# Patient Record
Sex: FEMALE | Race: WHITE | NOT HISPANIC OR LATINO | URBAN - METROPOLITAN AREA
[De-identification: names, ages, dates, MRNs, and addresses within clinical notes are randomized per-mention and may not be internally consistent; named-entity substitution may affect disease eponyms.]

---

## 2018-08-20 ENCOUNTER — APPOINTMENT (RX ONLY)
Dept: URBAN - METROPOLITAN AREA CLINIC 87 | Facility: CLINIC | Age: 63
Setting detail: DERMATOLOGY
End: 2018-08-20

## 2018-08-20 PROBLEM — D48.5 NEOPLASM OF UNCERTAIN BEHAVIOR OF SKIN: Status: ACTIVE | Noted: 2018-08-20

## 2018-08-20 PROCEDURE — ? BIOPSY BY SHAVE METHOD

## 2018-08-20 PROCEDURE — 11100: CPT

## 2018-08-20 NOTE — PROCEDURE: BIOPSY BY SHAVE METHOD
Type Of Destruction Used: Curettage
Destruction After The Procedure: No
Additional Anesthesia Volume In Cc (Will Not Render If 0): 0
Curettage Text: The wound bed was treated with curettage after the biopsy was performed.
Electrodesiccation And Curettage Text: The wound bed was treated with electrodesiccation and curettage after the biopsy was performed.
Post-Care Instructions: I reviewed with the patient in detail post-care instructions. Patient is to keep the biopsy site dry overnight, and then clean with gentle soap and water and apply Vaseline or aquaphor twice daily until healed. Patient may apply hydrogen peroxide soaks to remove any crusting.
Consent: Written consent was obtained and risks were reviewed including but not limited to scarring, infection, bleeding, scabbing, incomplete removal, nerve damage and allergy to anesthesia.
Electrodesiccation Text: The wound bed was treated with electrodesiccation after the biopsy was performed.
Billing Type: Third-Party Bill
Dressing: Band-Aid
Lab Facility: 1
Biopsy Type: H and E
Detail Level: Detailed
Biopsy Method: double edge Personna blade
Anesthesia Type: 1% lidocaine with epinephrine
Size Of Lesion In Cm: 0.5
Anesthesia Volume In Cc (Will Not Render If 0): 0.3
Depth Of Biopsy: dermis
Notification Instructions: Patient will be notified of biopsy results. However, patient instructed to call the office if not contacted within 2 weeks.
Silver Nitrate Text: The wound bed was treated with silver nitrate after the biopsy was performed.
Wound Care: Petrolatum
Was A Bandage Applied: Yes
Hemostasis: Aluminum Chloride
Cryotherapy Text: The wound bed was treated with cryotherapy after the biopsy was performed.
Lab: 3